# Patient Record
Sex: MALE | Race: WHITE | NOT HISPANIC OR LATINO | URBAN - METROPOLITAN AREA
[De-identification: names, ages, dates, MRNs, and addresses within clinical notes are randomized per-mention and may not be internally consistent; named-entity substitution may affect disease eponyms.]

---

## 2021-02-08 ENCOUNTER — OFFICE (OUTPATIENT)
Dept: URBAN - METROPOLITAN AREA CLINIC 79 | Facility: CLINIC | Age: 56
End: 2021-02-08
Payer: MEDICAID

## 2021-02-08 VITALS
HEIGHT: 69 IN | TEMPERATURE: 97.6 F | WEIGHT: 234 LBS | SYSTOLIC BLOOD PRESSURE: 134 MMHG | HEART RATE: 57 BPM | DIASTOLIC BLOOD PRESSURE: 68 MMHG

## 2021-02-08 DIAGNOSIS — I25.10 ATHEROSCLEROTIC HEART DISEASE OF NATIVE CORONARY ARTERY WITH: ICD-10-CM

## 2021-02-08 DIAGNOSIS — K62.5 HEMORRHAGE OF ANUS AND RECTUM: ICD-10-CM

## 2021-02-08 DIAGNOSIS — K52.9 NONINFECTIVE GASTROENTERITIS AND COLITIS, UNSPECIFIED: ICD-10-CM

## 2021-02-08 DIAGNOSIS — Z83.71 FAMILY HISTORY OF COLONIC POLYPS: ICD-10-CM

## 2021-02-08 DIAGNOSIS — R10.9 UNSPECIFIED ABDOMINAL PAIN: ICD-10-CM

## 2021-02-08 DIAGNOSIS — F17.200 NICOTINE DEPENDENCE, UNSPECIFIED, UNCOMPLICATED: ICD-10-CM

## 2021-02-08 DIAGNOSIS — R19.4 CHANGE IN BOWEL HABIT: ICD-10-CM

## 2021-02-08 PROCEDURE — 99204 OFFICE O/P NEW MOD 45 MIN: CPT | Performed by: PHYSICIAN ASSISTANT

## 2021-02-08 NOTE — SERVICEHPINOTES
MUNA SALDANA   is a   55   year old white male with past medical h/o drug addiction (IV drug use clean since 2015), alcoholism, chronic smoker, CAD, COPD, HTN who is being seen in consultation at the request of   MUNA HENDRICKSON   for ov prior to EGD to colonoscopy. No prior CRC screening test. He reports chronic diarrhea manifesting with BMs 4-6 x/day, BSS type 6-7 predominately and occasional BSS type 3-4. He mentions stomach pains accompanied by diarrhea that occur several times a day: No identifiable alleviating or aggravating factors. He notes "foamy-sulfur odor" stools No prior testing for EPI.  He has former h/o IV heroin and intranasal cocaine use Clean since 2015. Reviewed recent labs from PCP 01/2021 CBC, CMP, B12, folate, TSH, free T4, and PSA were all within normal range as well as negative Hep B panel and Hep C ab. He has h/o CAD on Eliquis that is rx'ed by PCP. He had cardiac cath in 2019 at [facility] per patient report. Chronic smoker ho COPD. Moderate ETOH drinker. No h/o esophageal variceal bleeding, ascites, HE. Denies n/v, lower abdominal pain, blood in stool, weight loss. No other complaints. BR

## 2021-02-16 LAB
GIARDIA/CRYPTOSPORIDIUM EIA: CRYPTOSPORIDIUM EIA: NEGATIVE
GIARDIA/CRYPTOSPORIDIUM EIA: GIARDIA LAMBLIA AG, EIA: NEGATIVE
OVA + PARASITE EXAM: (no result)
PANCREATIC ELASTASE, FECAL: >500 UG ELAST./G
RESULT: RESULT 1: (no result)

## 2021-02-26 ENCOUNTER — ON CAMPUS - OUTPATIENT (OUTPATIENT)
Dept: URBAN - METROPOLITAN AREA HOSPITAL 16 | Facility: HOSPITAL | Age: 56
End: 2021-02-26
Payer: MEDICAID

## 2021-02-26 DIAGNOSIS — K63.5 POLYP OF COLON: ICD-10-CM

## 2021-02-26 DIAGNOSIS — R19.7 DIARRHEA, UNSPECIFIED: ICD-10-CM

## 2021-02-26 DIAGNOSIS — R10.84 GENERALIZED ABDOMINAL PAIN: ICD-10-CM

## 2021-02-26 DIAGNOSIS — K62.5 HEMORRHAGE OF ANUS AND RECTUM: ICD-10-CM

## 2021-02-26 DIAGNOSIS — K29.60 OTHER GASTRITIS WITHOUT BLEEDING: ICD-10-CM

## 2021-02-26 DIAGNOSIS — Z83.71 FAMILY HISTORY OF COLONIC POLYPS: ICD-10-CM

## 2021-02-26 PROCEDURE — 45380 COLONOSCOPY AND BIOPSY: CPT | Performed by: INTERNAL MEDICINE

## 2021-02-26 PROCEDURE — 43239 EGD BIOPSY SINGLE/MULTIPLE: CPT | Performed by: INTERNAL MEDICINE
